# Patient Record
Sex: FEMALE | Race: WHITE | NOT HISPANIC OR LATINO | URBAN - METROPOLITAN AREA
[De-identification: names, ages, dates, MRNs, and addresses within clinical notes are randomized per-mention and may not be internally consistent; named-entity substitution may affect disease eponyms.]

---

## 2018-07-03 ENCOUNTER — OFFICE VISIT (OUTPATIENT)
Dept: URBAN - METROPOLITAN AREA CLINIC 45 | Facility: CLINIC | Age: 73
End: 2018-07-03
Payer: MEDICARE

## 2018-07-03 DIAGNOSIS — H40.1131 PRIMARY OPEN-ANGLE GLAUCOMA, BILATERAL, MILD STAGE: Primary | ICD-10-CM

## 2018-07-03 PROCEDURE — 99213 OFFICE O/P EST LOW 20 MIN: CPT | Performed by: OPHTHALMOLOGY

## 2018-07-03 ASSESSMENT — INTRAOCULAR PRESSURE
OS: 13
OD: 14

## 2018-07-03 NOTE — IMPRESSION/PLAN
Impression: Primary open-angle glaucoma, bilateral, mild stage: H40.1131. improved IOPs today OU Plan: Discussed diagnosis in detail with patient. Current therapy is best for patient. Discussed risks of progression. Cont. w/Latanoprost QHS OU.

## 2018-11-01 ENCOUNTER — OFFICE VISIT (OUTPATIENT)
Dept: URBAN - METROPOLITAN AREA CLINIC 45 | Facility: CLINIC | Age: 73
End: 2018-11-01
Payer: MEDICARE

## 2018-11-01 PROCEDURE — 99213 OFFICE O/P EST LOW 20 MIN: CPT | Performed by: OPHTHALMOLOGY

## 2018-11-01 ASSESSMENT — INTRAOCULAR PRESSURE
OS: 14
OD: 15

## 2018-11-01 NOTE — IMPRESSION/PLAN
Impression: Primary open-angle glaucoma, bilateral, mild stage: H40.1131. improved IOPs today OU Plan: Discussed diagnosis in detail with patient. Current therapy is best for patient. Discussed risks of progression. Cont. w/Latanoprost QHS OU. 6 month DE, OCT and HVF.  [id number] for tech to check IOP prior to seeing M.D.

## 2019-05-09 ENCOUNTER — OFFICE VISIT (OUTPATIENT)
Dept: URBAN - METROPOLITAN AREA CLINIC 11 | Facility: CLINIC | Age: 74
End: 2019-05-09
Payer: MEDICARE

## 2019-05-09 PROCEDURE — 99203 OFFICE O/P NEW LOW 30 MIN: CPT | Performed by: OPTOMETRIST

## 2019-05-09 PROCEDURE — 99213 OFFICE O/P EST LOW 20 MIN: CPT | Performed by: OPTOMETRIST

## 2019-05-09 ASSESSMENT — INTRAOCULAR PRESSURE
OS: 17
OD: 18

## 2019-05-09 ASSESSMENT — VISUAL ACUITY
OS: 20/25
OD: 20/25

## 2019-05-09 NOTE — IMPRESSION/PLAN
Impression: Primary open-angle glaucoma, bilateral, mild stage: H40.1131. Plan: IOP slightly elevated today ou.  Cont Latanoprost QHS OU. schedule VF 24-2. f/u 4mns IOP DE OCT VF

## 2019-09-18 ENCOUNTER — OFFICE VISIT (OUTPATIENT)
Dept: URBAN - METROPOLITAN AREA CLINIC 11 | Facility: CLINIC | Age: 74
End: 2019-09-18
Payer: MEDICARE

## 2019-09-18 PROCEDURE — 92083 EXTENDED VISUAL FIELD XM: CPT | Performed by: OPTOMETRIST

## 2019-09-18 PROCEDURE — 92133 CPTRZD OPH DX IMG PST SGM ON: CPT | Performed by: OPTOMETRIST

## 2019-09-18 PROCEDURE — 92014 COMPRE OPH EXAM EST PT 1/>: CPT | Performed by: OPTOMETRIST

## 2019-09-18 ASSESSMENT — INTRAOCULAR PRESSURE
OD: 15
OS: 15

## 2019-09-18 NOTE — IMPRESSION/PLAN
Impression: Nonexudative age-related macular degeneration, bilateral, early dry stage: H35.3131. Plan: No CNV. continue AREDS2 vit and AG daily. Call if any change to [address]

## 2019-09-18 NOTE — IMPRESSION/PLAN
Impression: Primary open-angle glaucoma, bilateral, mild stage: H40.1131. VF 9/19 borderline OU, OCT 9/19 7/7 88/97 Plan: OCT of RNFL and VF ordered and performed today ou. RNFL and VF stable. IOP at good level today ou.  Cont Latanoprost QHS OU. f/u 4mns

## 2020-01-22 ENCOUNTER — OFFICE VISIT (OUTPATIENT)
Dept: URBAN - METROPOLITAN AREA CLINIC 11 | Facility: CLINIC | Age: 75
End: 2020-01-22
Payer: MEDICARE

## 2020-01-22 PROCEDURE — 99213 OFFICE O/P EST LOW 20 MIN: CPT | Performed by: OPTOMETRIST

## 2020-01-22 ASSESSMENT — INTRAOCULAR PRESSURE
OS: 18
OD: 18

## 2020-01-22 NOTE — IMPRESSION/PLAN
Impression: Primary open-angle glaucoma, bilateral, mild stage: H40.1131. VF 9/19 borderline OU, OCT 9/19 7/7 88/97 Plan: IOP slightly elevated today. Cont Latanoprost QHS OU. Pt ed on importance of drops and regular follow ups.  
f/u 4mns IOP, Photos

## 2020-06-18 ENCOUNTER — OFFICE VISIT (OUTPATIENT)
Dept: URBAN - METROPOLITAN AREA CLINIC 11 | Facility: CLINIC | Age: 75
End: 2020-06-18
Payer: MEDICARE

## 2020-06-18 DIAGNOSIS — H52.4 PRESBYOPIA: Primary | ICD-10-CM

## 2020-06-18 PROCEDURE — 92012 INTRM OPH EXAM EST PATIENT: CPT | Performed by: OPTOMETRIST

## 2020-06-18 PROCEDURE — 92250 FUNDUS PHOTOGRAPHY W/I&R: CPT | Performed by: OPTOMETRIST

## 2020-06-18 ASSESSMENT — INTRAOCULAR PRESSURE
OS: 17
OD: 17

## 2020-06-18 ASSESSMENT — VISUAL ACUITY
OS: 20/25
OD: 20/25

## 2020-06-18 NOTE — IMPRESSION/PLAN
Impression: Primary open-angle glaucoma, bilateral, mild stage: H40.1131. VF 9/19 borderline OU, OCT 9/19 7/7 88/97 Plan: photos of [address] [place] 75 ordered and performed today ou. IOP slightly elevated today. Cont Latanoprost QHS OU. Pt ed on importance of drops and regular follow ups.  
f/u 4mns IOP, DE OCT VF

## 2020-11-25 ENCOUNTER — OFFICE VISIT (OUTPATIENT)
Dept: URBAN - METROPOLITAN AREA CLINIC 11 | Facility: CLINIC | Age: 75
End: 2020-11-25
Payer: MEDICARE

## 2020-11-25 PROCEDURE — 92083 EXTENDED VISUAL FIELD XM: CPT | Performed by: OPTOMETRIST

## 2020-11-25 PROCEDURE — 92014 COMPRE OPH EXAM EST PT 1/>: CPT | Performed by: OPTOMETRIST

## 2020-11-25 PROCEDURE — 92133 CPTRZD OPH DX IMG PST SGM ON: CPT | Performed by: OPTOMETRIST

## 2020-11-25 ASSESSMENT — INTRAOCULAR PRESSURE
OS: 18
OD: 18

## 2020-11-25 NOTE — ASSESSMENT/PLAN
Impression: Visual Field - OD: Good-arcuate - inferior; OS: Good-full Plan: Continue drops. No change in treatment based on test results.

## 2020-11-25 NOTE — IMPRESSION/PLAN
Impression: Primary open-angle glaucoma, bilateral, mild stage: H40.1131. VF 11/20 OD IA mild, OS full, OCT  11/20 7/7 86/95(88/97); Plan: OCT of RNFL and VF ordered and performed today ou. VF defects ou - stable. RNFL thinning od - stable. IOP at satisfactory level today ou . Cont Latanoprost QHS OU. Pt ed on importance of drops and regular follow ups.  
f/u 4mns IOP

## 2020-11-25 NOTE — ASSESSMENT/PLAN
Impression: OCT - OD: Good-thinning; OS: Good-normal Plan: Continue drops. No change in treatment based on test results.

## 2020-11-25 NOTE — IMPRESSION/PLAN
Impression: Nonexudative age-related macular degeneration, bilateral, early dry stage: H35.3131. Plan: No CNV. continue AREDS2 vit and AG daily. Call if any change to [place].

## 2021-03-31 ENCOUNTER — OFFICE VISIT (OUTPATIENT)
Dept: URBAN - METROPOLITAN AREA CLINIC 11 | Facility: CLINIC | Age: 76
End: 2021-03-31
Payer: MEDICARE

## 2021-03-31 PROCEDURE — 99213 OFFICE O/P EST LOW 20 MIN: CPT | Performed by: OPTOMETRIST

## 2021-03-31 PROCEDURE — 92134 CPTRZ OPH DX IMG PST SGM RTA: CPT | Performed by: OPTOMETRIST

## 2021-03-31 RX ORDER — LATANOPROST 50 UG/ML
0.005 % SOLUTION OPHTHALMIC
Qty: 10 | Refills: 5 | Status: ACTIVE
Start: 2021-03-31

## 2021-03-31 ASSESSMENT — INTRAOCULAR PRESSURE
OS: 17
OD: 17

## 2021-03-31 NOTE — IMPRESSION/PLAN
Impression: Primary open-angle glaucoma, bilateral, mild stage: H40.1131. Tmax 23/22 VF 11/20 OD IA mild, OS full, OCT  11/20 7/7 86/95(88/97); GCL 03/21 66/64 - thin OU Plan: MAC OCT (GCL) ordered and performed today ou. GCL Thin OU. IOP acceptable level today ou. Cont Latanoprost QHS OU. Pt ed on importance of drops and regular follow ups.  
f/u 4mns IOP & Photos

## 2021-03-31 NOTE — ASSESSMENT/PLAN
Impression: OCT MAC - OD: Good-thinning; OS: Good-thinning Plan: Continue drops. No change in treatment based on test results.

## 2021-07-07 ENCOUNTER — OFFICE VISIT (OUTPATIENT)
Dept: URBAN - METROPOLITAN AREA CLINIC 11 | Facility: CLINIC | Age: 76
End: 2021-07-07
Payer: MEDICARE

## 2021-07-07 PROCEDURE — 99213 OFFICE O/P EST LOW 20 MIN: CPT | Performed by: OPTOMETRIST

## 2021-07-07 PROCEDURE — 92250 FUNDUS PHOTOGRAPHY W/I&R: CPT | Performed by: OPTOMETRIST

## 2021-07-07 ASSESSMENT — INTRAOCULAR PRESSURE
OD: 16
OS: 16

## 2021-07-07 NOTE — IMPRESSION/PLAN
Impression: Primary open-angle glaucoma, bilateral, mild stage: H40.1131. Tmax 23/22 VF 11/20 OD IA mild, OS full, OCT  11/20 7/7 86/95(88/97); GCL 03/21 66/64 - thin OU; Photos 07/21 Plan: Photos of optic nerve head ordered and performed today ou. IOP acceptable level today ou. Cont Latanoprost QHS OU. Pt ed on importance of drops and regular follow ups.  
f/u 4 month IOP DE OCT 2021 09:58

## 2021-11-08 ENCOUNTER — OFFICE VISIT (OUTPATIENT)
Dept: URBAN - METROPOLITAN AREA CLINIC 11 | Facility: CLINIC | Age: 76
End: 2021-11-08
Payer: MEDICARE

## 2021-11-08 DIAGNOSIS — H25.13 AGE-RELATED NUCLEAR CATARACT, BILATERAL: ICD-10-CM

## 2021-11-08 DIAGNOSIS — H35.3131 NONEXUDATIVE AGE-RELATED MACULAR DEGENERATION, BILATERAL, EARLY DRY STAGE: ICD-10-CM

## 2021-11-08 PROCEDURE — 92133 CPTRZD OPH DX IMG PST SGM ON: CPT | Performed by: OPTOMETRIST

## 2021-11-08 PROCEDURE — 92014 COMPRE OPH EXAM EST PT 1/>: CPT | Performed by: OPTOMETRIST

## 2021-11-08 ASSESSMENT — INTRAOCULAR PRESSURE
OS: 16
OD: 16

## 2021-11-08 NOTE — IMPRESSION/PLAN
Impression: Primary open-angle glaucoma, bilateral, mild stage: H40.1131. Tmax 23/22 VF 11/20 OD IA mild, OS full, OCT 11/21 7/6 83/96 (86/95)(88/97); GCL 03/21 66/64 - thin OU; Photos 07/21 Plan: OCT RNFL ordered and performed today ou. RNFL thinning OD. IOP good level today ou. Cont Latanoprost QHS OU. Pt ed on importance of drops and regular follow ups.  
f/u 4 month IOP and VF

## 2021-11-08 NOTE — IMPRESSION/PLAN
Impression: Age-related nuclear cataract, bilateral: H25.13. Plan: Discussed diagnosis in detail with patient. No treatment is required at this time. Will continue to observe condition and or symptoms. Call if [address] worsens.

## 2022-02-08 ENCOUNTER — OFFICE VISIT (OUTPATIENT)
Dept: URBAN - METROPOLITAN AREA CLINIC 11 | Facility: CLINIC | Age: 77
End: 2022-02-08
Payer: MEDICARE

## 2022-02-08 PROCEDURE — 92134 CPTRZ OPH DX IMG PST SGM RTA: CPT | Performed by: OPTOMETRIST

## 2022-02-08 PROCEDURE — 99214 OFFICE O/P EST MOD 30 MIN: CPT | Performed by: OPTOMETRIST

## 2022-02-08 ASSESSMENT — INTRAOCULAR PRESSURE
OS: 16
OD: 15

## 2022-02-08 NOTE — IMPRESSION/PLAN
Impression: Nonexudative age-related macular degeneration of right eye, early dry stage: H35.3111.  Plan: see note #1

## 2022-02-08 NOTE — IMPRESSION/PLAN
Impression: Primary open-angle glaucoma, bilateral, mild stage: H40.1131. Tmax 23/22 VF 11/20 OD IA mild, OS full, OCT 11/21 7/6 83/96 (86/95)(88/97); GCL 03/21 66/64 - thin OU; Photos 07/21 Plan: Cont Latanoprost QHS OU. Pt ed on importance of drops and regular follow ups.  
f/u 1 month IOP and VF

## 2022-02-08 NOTE — IMPRESSION/PLAN
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 Plan: OCT of macula ordered and performed today ou. Possible CNV starting OS. Refer to retinal specialist for eval/ tx.  Continue AREDS 2 vit

## 2022-02-16 ENCOUNTER — OFFICE VISIT (OUTPATIENT)
Dept: URBAN - METROPOLITAN AREA CLINIC 45 | Facility: CLINIC | Age: 77
End: 2022-02-16
Payer: MEDICARE

## 2022-02-16 DIAGNOSIS — H35.3111 NONEXUDATIVE AGE-RELATED MACULAR DEGENERATION OF RIGHT EYE, EARLY DRY STAGE: ICD-10-CM

## 2022-02-16 DIAGNOSIS — H35.3221 EXUDATIVE AGE-RELATED MACULAR DEGENERATION, LEFT EYE, WITH ACTIVE CHOROIDAL NEOVASCULARIZATION: Primary | ICD-10-CM

## 2022-02-16 PROCEDURE — 92134 CPTRZ OPH DX IMG PST SGM RTA: CPT | Performed by: OPHTHALMOLOGY

## 2022-02-16 PROCEDURE — 67028 INJECTION EYE DRUG: CPT | Performed by: OPHTHALMOLOGY

## 2022-02-16 PROCEDURE — 92004 COMPRE OPH EXAM NEW PT 1/>: CPT | Performed by: OPHTHALMOLOGY

## 2022-02-16 PROCEDURE — 92235 FLUORESCEIN ANGRPH MLTIFRAME: CPT | Performed by: OPHTHALMOLOGY

## 2022-02-16 ASSESSMENT — INTRAOCULAR PRESSURE
OD: 18
OS: 18

## 2022-02-16 NOTE — IMPRESSION/PLAN
Impression: Exudative age-related macular degeneration of left eye w/ active choroidal neovascularization: H35.3221. Plan: REID OS today No darci CNVM on FA, but drop in [address] objective and subjective 4wk OCT/REID OS

## 2022-02-16 NOTE — IMPRESSION/PLAN
Impression: Nonexudative age-related macular degeneration of right eye, early dry stage: H35.3111. Plan: AREDS2/[place] 6m OCT/exam

## 2022-03-16 ENCOUNTER — OFFICE VISIT (OUTPATIENT)
Dept: URBAN - METROPOLITAN AREA CLINIC 45 | Facility: CLINIC | Age: 77
End: 2022-03-16
Payer: MEDICARE

## 2022-03-16 PROCEDURE — 67028 INJECTION EYE DRUG: CPT | Performed by: OPHTHALMOLOGY

## 2022-03-16 PROCEDURE — 92134 CPTRZ OPH DX IMG PST SGM RTA: CPT | Performed by: OPHTHALMOLOGY

## 2022-03-16 ASSESSMENT — INTRAOCULAR PRESSURE
OD: 14
OS: 15

## 2022-03-16 NOTE — IMPRESSION/PLAN
Impression: Exudative age-related macular degeneration of left eye w/ active choroidal neovascularization: H35.3221. Plan: Repeat REID OS today -- extend No darci CNVM on FA, questionable fluid on OCT -- gets subjective improvement with shots 6wk OCT/REID OS

## 2023-08-28 ENCOUNTER — APPOINTMENT (RX ONLY)
Dept: RURAL CLINIC 2 | Facility: CLINIC | Age: 78
Setting detail: DERMATOLOGY
End: 2023-08-28

## 2023-08-28 DIAGNOSIS — L82.1 OTHER SEBORRHEIC KERATOSIS: ICD-10-CM

## 2023-08-28 DIAGNOSIS — L72.8 OTHER FOLLICULAR CYSTS OF THE SKIN AND SUBCUTANEOUS TISSUE: ICD-10-CM

## 2023-08-28 DIAGNOSIS — L57.0 ACTINIC KERATOSIS: ICD-10-CM

## 2023-08-28 DIAGNOSIS — D485 NEOPLASM OF UNCERTAIN BEHAVIOR OF SKIN: ICD-10-CM

## 2023-08-28 PROBLEM — D48.5 NEOPLASM OF UNCERTAIN BEHAVIOR OF SKIN: Status: ACTIVE | Noted: 2023-08-28

## 2023-08-28 PROCEDURE — 17003 DESTRUCT PREMALG LES 2-14: CPT

## 2023-08-28 PROCEDURE — 99202 OFFICE O/P NEW SF 15 MIN: CPT | Mod: 25

## 2023-08-28 PROCEDURE — ? BIOPSY BY SHAVE METHOD

## 2023-08-28 PROCEDURE — ? COUNSELING

## 2023-08-28 PROCEDURE — 11102 TANGNTL BX SKIN SINGLE LES: CPT

## 2023-08-28 PROCEDURE — ? LIQUID NITROGEN

## 2023-08-28 PROCEDURE — 17000 DESTRUCT PREMALG LESION: CPT | Mod: 59

## 2023-08-28 ASSESSMENT — LOCATION DETAILED DESCRIPTION DERM
LOCATION DETAILED: RIGHT INFERIOR LATERAL FOREHEAD
LOCATION DETAILED: RIGHT INFERIOR CENTRAL MALAR CHEEK
LOCATION DETAILED: RIGHT SUPERIOR LATERAL MALAR CHEEK
LOCATION DETAILED: LEFT MEDIAL BREAST 7-8:00 REGION
LOCATION DETAILED: RIGHT LATERAL MALAR CHEEK

## 2023-08-28 ASSESSMENT — LOCATION ZONE DERM
LOCATION ZONE: TRUNK
LOCATION ZONE: FACE

## 2023-08-28 ASSESSMENT — LOCATION SIMPLE DESCRIPTION DERM
LOCATION SIMPLE: RIGHT FOREHEAD
LOCATION SIMPLE: LEFT BREAST
LOCATION SIMPLE: RIGHT CHEEK

## 2023-08-28 NOTE — PROCEDURE: LIQUID NITROGEN
Consent: The patient's consent was obtained including but not limited to risks of crusting, scabbing, blistering, scarring, darker or lighter pigmentary change, recurrence, incomplete removal and infection.
Render Post-Care Instructions In Note?: no
Number Of Freeze-Thaw Cycles: 2 freeze-thaw cycles
Duration Of Freeze Thaw-Cycle (Seconds): 0
Post-Care Instructions: I reviewed with the patient in detail post-care instructions. Patient is to wear sunprotection, and avoid picking at any of the treated lesions. Pt may apply Vaseline to crusted or scabbing areas.
Show Applicator Variable?: Yes
Detail Level: Detailed

## 2024-08-19 ENCOUNTER — APPOINTMENT (RX ONLY)
Dept: RURAL CLINIC 2 | Facility: CLINIC | Age: 79
Setting detail: DERMATOLOGY
End: 2024-08-19

## 2024-08-19 DIAGNOSIS — L57.0 ACTINIC KERATOSIS: ICD-10-CM

## 2024-08-19 PROCEDURE — ? LIQUID NITROGEN

## 2024-08-19 PROCEDURE — 17000 DESTRUCT PREMALG LESION: CPT

## 2024-08-19 ASSESSMENT — LOCATION DETAILED DESCRIPTION DERM: LOCATION DETAILED: LEFT PROXIMAL DORSAL FOREARM

## 2024-08-19 ASSESSMENT — LOCATION ZONE DERM: LOCATION ZONE: ARM

## 2024-08-19 ASSESSMENT — LOCATION SIMPLE DESCRIPTION DERM: LOCATION SIMPLE: LEFT FOREARM

## 2024-08-19 NOTE — PROCEDURE: LIQUID NITROGEN
Consent: The patient's consent was obtained including but not limited to risks of crusting, scabbing, blistering, scarring, darker or lighter pigmentary change, recurrence, incomplete removal and infection.
Show Applicator Variable?: Yes
Duration Of Freeze Thaw-Cycle (Seconds): 0
Number Of Freeze-Thaw Cycles: 2 freeze-thaw cycles
Render Note In Bullet Format When Appropriate: No
Post-Care Instructions: I reviewed with the patient in detail post-care instructions. Patient is to wear sunprotection, and avoid picking at any of the treated lesions. Pt may apply Vaseline to crusted or scabbing areas.
Detail Level: Detailed

## 2025-05-08 ENCOUNTER — APPOINTMENT (OUTPATIENT)
Dept: RURAL CLINIC 2 | Facility: CLINIC | Age: 80
Setting detail: DERMATOLOGY
End: 2025-05-08

## 2025-05-08 DIAGNOSIS — L57.0 ACTINIC KERATOSIS: ICD-10-CM

## 2025-05-08 DIAGNOSIS — L82.1 OTHER SEBORRHEIC KERATOSIS: ICD-10-CM

## 2025-05-08 PROCEDURE — ? COUNSELING

## 2025-05-08 PROCEDURE — ? OTHER

## 2025-05-08 PROCEDURE — ? ORDER FOR PHOTODYNAMIC THERAPY

## 2025-05-08 PROCEDURE — ? EDUCATIONAL RESOURCES PROVIDED

## 2025-05-08 PROCEDURE — 99213 OFFICE O/P EST LOW 20 MIN: CPT

## 2025-05-08 ASSESSMENT — LOCATION ZONE DERM
LOCATION ZONE: NOSE
LOCATION ZONE: FACE

## 2025-05-08 ASSESSMENT — LOCATION DETAILED DESCRIPTION DERM
LOCATION DETAILED: NASAL DORSUM
LOCATION DETAILED: RIGHT INFERIOR CENTRAL MALAR CHEEK
LOCATION DETAILED: RIGHT LATERAL MALAR CHEEK
LOCATION DETAILED: LEFT MEDIAL FOREHEAD
LOCATION DETAILED: LEFT INFERIOR CENTRAL MALAR CHEEK

## 2025-05-08 ASSESSMENT — LOCATION SIMPLE DESCRIPTION DERM
LOCATION SIMPLE: LEFT FOREHEAD
LOCATION SIMPLE: LEFT CHEEK
LOCATION SIMPLE: RIGHT CHEEK
LOCATION SIMPLE: NOSE

## 2025-05-08 NOTE — HPI: SKIN LESION
How Severe Is Your Skin Lesion?: moderate
Has Your Skin Lesion Been Treated?: not been treated
Is This A New Presentation, Or A Follow-Up?: Skin Lesion
Additional History: Pt has used cortisone cream on the area but it has not helped at all

## 2025-05-08 NOTE — PROCEDURE: ORDER FOR PHOTODYNAMIC THERAPY
Legs Incubation Time: 2 Hours
Face Incubation Time: 1.5 Hours
Face And Scalp Incubation Time: 1 Hour for the face and 2 Hours for the scalp
Frequency Of Pdt: Single Treatment
Neck Incubation Time: 1 Hour
Occlusion: No
Location: Face
Photosensitizer: Levulan
Pdt Type: YOHANA-U
Consent: The procedure and risks were reviewed with the patient including but not limited to: burning, pigmentary changes, pain, blistering, scabbing, redness, and the possibility of needing numerous treatments. Strict photoprotection after the procedure was also discussed.
Detail Level: Zone

## 2025-05-08 NOTE — PROCEDURE: OTHER
Detail Level: Detailed
Render Risk Assessment In Note?: no
Note Text (......Xxx Chief Complaint.): This diagnosis correlates with the
Other (Free Text): Check response to treatment on June 16.